# Patient Record
Sex: FEMALE | Race: WHITE
[De-identification: names, ages, dates, MRNs, and addresses within clinical notes are randomized per-mention and may not be internally consistent; named-entity substitution may affect disease eponyms.]

---

## 2019-12-23 ENCOUNTER — HOSPITAL ENCOUNTER (OUTPATIENT)
Dept: HOSPITAL 39 - RAD | Age: 69
End: 2019-12-23
Attending: FAMILY MEDICINE
Payer: COMMERCIAL

## 2019-12-23 DIAGNOSIS — M25.472: ICD-10-CM

## 2019-12-23 DIAGNOSIS — M79.9: Primary | ICD-10-CM

## 2019-12-23 NOTE — RAD
EXAM DESCRIPTION: 



Ankle,Left 3 Views



CLINICAL HISTORY: 



PAIN IN LEFT ANKLE AND JOINTS OF LEFT FOOT



COMPARISON: 



None.



TECHNIQUE: 



3 views left



FINDINGS: 



Soft tissue swelling is observed over the lateral malleolus. The

ankle mortise is intact. No fracturing is detected. A joint

effusion is evident.



IMPRESSION: 



Soft tissue swelling and evidence of a joint effusion are

observed. No fracturing is detected.



Electronically signed by:  Brent Gerard MD  12/23/2019 1:28 PM

[facility] Workstation: [id number]

## 2019-12-30 ENCOUNTER — HOSPITAL ENCOUNTER (OUTPATIENT)
Dept: HOSPITAL 39 - RAD | Age: 69
End: 2019-12-30
Attending: FAMILY MEDICINE
Payer: COMMERCIAL

## 2019-12-30 DIAGNOSIS — M25.572: Primary | ICD-10-CM

## 2019-12-30 DIAGNOSIS — M79.9: ICD-10-CM

## 2019-12-30 NOTE — RAD
EXAM DESCRIPTION: 



Ankle,Left 3 Views



CLINICAL HISTORY: 



ANKLE PAIN



COMPARISON: 



None.



TECHNIQUE: 



3 views left



FINDINGS: 



Soft tissue swelling is observed over the lateral malleolus. The

ankle mortise is intact. No fracturing is detected.



IMPRESSION: 



Soft tissue swelling is observed without evidence of fracturing.



Electronically signed by:  Brent Gerard MD  12/30/2019 6:13 PM

[facility] Workstation: [id number]

## 2020-03-09 ENCOUNTER — HOSPITAL ENCOUNTER (OUTPATIENT)
Age: 70
Discharge: HOME | End: 2020-03-09
Payer: COMMERCIAL

## 2020-03-09 DIAGNOSIS — H25.12: Primary | ICD-10-CM

## 2020-03-09 PROCEDURE — 00142 ANES PX ON EYE LENS SURGERY: CPT

## 2020-03-09 PROCEDURE — 66984 XCAPSL CTRC RMVL W/O ECP: CPT
